# Patient Record
Sex: FEMALE | Race: WHITE
[De-identification: names, ages, dates, MRNs, and addresses within clinical notes are randomized per-mention and may not be internally consistent; named-entity substitution may affect disease eponyms.]

---

## 2020-02-10 ENCOUNTER — HOSPITAL ENCOUNTER (OUTPATIENT)
Dept: HOSPITAL 47 - BARWHC3 | Age: 55
Discharge: HOME | End: 2020-02-10
Attending: SURGERY
Payer: COMMERCIAL

## 2020-02-10 VITALS — HEART RATE: 101 BPM | DIASTOLIC BLOOD PRESSURE: 87 MMHG | TEMPERATURE: 97.7 F | SYSTOLIC BLOOD PRESSURE: 114 MMHG

## 2020-02-10 VITALS — BODY MASS INDEX: 35.4 KG/M2

## 2020-02-10 DIAGNOSIS — Z98.84: ICD-10-CM

## 2020-02-10 DIAGNOSIS — F17.200: ICD-10-CM

## 2020-02-10 DIAGNOSIS — E88.81: ICD-10-CM

## 2020-02-10 DIAGNOSIS — E55.9: ICD-10-CM

## 2020-02-10 DIAGNOSIS — E66.01: Primary | ICD-10-CM

## 2020-02-10 LAB
ERYTHROCYTE [DISTWIDTH] IN BLOOD BY AUTOMATED COUNT: 5.02 M/UL (ref 3.8–5.4)
ERYTHROCYTE [DISTWIDTH] IN BLOOD: 13.1 % (ref 11.5–15.5)
HCT VFR BLD AUTO: 46 % (ref 34–46)
HGB BLD-MCNC: 15 GM/DL (ref 11.4–16)
MCH RBC QN AUTO: 29.8 PG (ref 25–35)
MCHC RBC AUTO-ENTMCNC: 32.6 G/DL (ref 31–37)
MCV RBC AUTO: 91.5 FL (ref 80–100)
PLATELET # BLD AUTO: 261 K/UL (ref 150–450)
WBC # BLD AUTO: 7.7 K/UL (ref 3.8–10.6)

## 2020-02-10 PROCEDURE — 84425 ASSAY OF VITAMIN B-1: CPT

## 2020-02-10 PROCEDURE — 83036 HEMOGLOBIN GLYCOSYLATED A1C: CPT

## 2020-02-10 PROCEDURE — 80053 COMPREHEN METABOLIC PANEL: CPT

## 2020-02-10 PROCEDURE — 82607 VITAMIN B-12: CPT

## 2020-02-10 PROCEDURE — 93005 ELECTROCARDIOGRAM TRACING: CPT

## 2020-02-10 PROCEDURE — 99211 OFF/OP EST MAY X REQ PHY/QHP: CPT

## 2020-02-10 PROCEDURE — 82746 ASSAY OF FOLIC ACID SERUM: CPT

## 2020-02-10 PROCEDURE — 36415 COLL VENOUS BLD VENIPUNCTURE: CPT

## 2020-02-10 PROCEDURE — 85027 COMPLETE CBC AUTOMATED: CPT

## 2020-02-10 PROCEDURE — 82306 VITAMIN D 25 HYDROXY: CPT

## 2020-02-11 LAB
ALBUMIN SERPL-MCNC: 4.6 G/DL (ref 3.8–4.9)
ALBUMIN/GLOB SERPL: 2 G/DL (ref 1.6–3.17)
ALP SERPL-CCNC: 108 U/L (ref 41–126)
ALT SERPL-CCNC: 21 U/L (ref 8–44)
ANION GAP SERPL CALC-SCNC: 10.5 MMOL/L (ref 4–12)
AST SERPL-CCNC: 19 U/L (ref 13–35)
BUN SERPL-SCNC: 15 MG/DL (ref 9–27)
BUN/CREAT SERPL: 18.75 RATIO (ref 12–20)
CALCIUM SPEC-MCNC: 9.6 MG/DL (ref 8.7–10.3)
CHLORIDE SERPL-SCNC: 107 MMOL/L (ref 96–109)
CO2 SERPL-SCNC: 22.5 MMOL/L (ref 21.6–31.8)
GLOBULIN SER CALC-MCNC: 2.3 G/DL (ref 1.6–3.3)
GLUCOSE SERPL-MCNC: 105 MG/DL (ref 70–110)
HBA1C MFR BLD: 5.2 % (ref 4–6)
POTASSIUM SERPL-SCNC: 4.1 MMOL/L (ref 3.5–5.5)
PROT SERPL-MCNC: 6.9 G/DL (ref 6.2–8.2)
SODIUM SERPL-SCNC: 140 MMOL/L (ref 135–145)
VIT B12 SERPL-MCNC: 397 PG/ML (ref 200–944)

## 2020-02-17 NOTE — P.HPBAR
Bariatric H&P





- History & Physicial


H&P Date: 02/10/20


History & Physicial: 


Visit/CC: sleeve consult





Patient initial contact: 





Initial weight: 102.648 kg


Initial weight in pounds: 226.30


Height: 5 ft 7 in


Initial BMI: 35.4





Last weight: 





Current weight: 102.648 kg


Current weight in pounds: 226.30


Current BMI: 35.4





Ideal body weight (based on NIH guidelines): 61.235 kg





Excess body weight loss: 0.0%








The patient is a 54 year-old F who presents for Bariatric Assessment.  Patient 

presents today for presurgical gastric sleeve consultation.  She has had 

lifetime problems obesity.  Her LAP-BAND was removed over 10 years ago.  Her 

current BMI is 35.














Past Medical History


Past Medical History: Hyperlipidemia, Hypertension


Additional Past Medical History / Comment(s): Chrohns Disease,


History of Any Multi-Drug Resistant Organisms: C-DIFF


Year Discovered:: November 2018


MDRO Source:: stool


Past Surgical History: Bariatric Surgery, Bladder Surgery, Breast Surgery, 

Orthopedic Surgery


Additional Past Surgical History / Comment(s): lap band placed 9/11/2001 

(removed 2010), right hand trigger finger release, bladder suspension, bilateral

breast augmentation, panniculectomy,


Past Anesthesia/Blood Transfusion Reactions: No Reported Reaction


Past Psychological History: Anxiety, Depression


Additional Psychological History / Comment(s): Takes Zoloft and wellbutrin daily


Smoking Status: Current some day smoker


Past Alcohol Use History: Occasional


Past Drug Use History: None Reported





Surgical - Exam


                                   Vital Signs











Temp Pulse BP


 


 97.7 F   101 H  114/87 


 


 02/10/20 14:10  02/10/20 14:10  02/10/20 14:10














- General


well developed, no distress





- Eyes


PERRL





- ENT


normal pinna





- Neck


no masses





- Respiratory


normal expansion





- Cardiovascular


Rhythm: regular





- Abdomen


Abdomen: soft, non tender





Results





- Labs





                                 02/10/20 15:31





                                 02/10/20 15:31





Bariatric Assessment & Plan


Plan: 





Morbid obesity, BMI 35.  Dilantin discussion with the patient regarding sleeve 

gastrectomy.  I went over the risks and benefits of procedure including possible

gastric staple line disruption, bleeding or scarring.





Bariatric Checklist


Checklist: 


Plan: 





Checklist: 





EGD: 


1. Hiatal hernia: 


2. H. Pylori: 





HgbA1c: 





Vitamin D: 





Smoking: Current some day smoker





Primary care physician referral: Dr. AMINA Alcantar ([facility]) 

PH:[phone]





Psychiatry clearance: 





Cardiology clearance: 





Sleep study: 





Diet journal: 





VTE risk score: 





VTE risk level: 





Rehab needs at discharge:

## 2020-02-24 ENCOUNTER — HOSPITAL ENCOUNTER (OUTPATIENT)
Dept: HOSPITAL 47 - ORWHC2ENDO | Age: 55
Discharge: HOME | End: 2020-02-24
Attending: SURGERY
Payer: COMMERCIAL

## 2020-02-24 VITALS — RESPIRATION RATE: 17 BRPM | SYSTOLIC BLOOD PRESSURE: 111 MMHG | HEART RATE: 65 BPM | DIASTOLIC BLOOD PRESSURE: 68 MMHG

## 2020-02-24 VITALS — TEMPERATURE: 98.6 F

## 2020-02-24 VITALS — BODY MASS INDEX: 36.8 KG/M2

## 2020-02-24 DIAGNOSIS — I10: ICD-10-CM

## 2020-02-24 DIAGNOSIS — E78.5: ICD-10-CM

## 2020-02-24 DIAGNOSIS — K50.90: ICD-10-CM

## 2020-02-24 DIAGNOSIS — F32.9: ICD-10-CM

## 2020-02-24 DIAGNOSIS — K29.50: ICD-10-CM

## 2020-02-24 DIAGNOSIS — Z86.19: ICD-10-CM

## 2020-02-24 DIAGNOSIS — F17.200: ICD-10-CM

## 2020-02-24 DIAGNOSIS — Z79.899: ICD-10-CM

## 2020-02-24 DIAGNOSIS — E66.01: ICD-10-CM

## 2020-02-24 DIAGNOSIS — K21.0: Primary | ICD-10-CM

## 2020-02-24 DIAGNOSIS — K22.8: ICD-10-CM

## 2020-02-24 DIAGNOSIS — K44.9: ICD-10-CM

## 2020-02-24 DIAGNOSIS — Z98.84: ICD-10-CM

## 2020-02-24 PROCEDURE — 43239 EGD BIOPSY SINGLE/MULTIPLE: CPT

## 2020-02-24 PROCEDURE — 88312 SPECIAL STAINS GROUP 1: CPT

## 2020-02-24 PROCEDURE — 88305 TISSUE EXAM BY PATHOLOGIST: CPT

## 2020-02-24 NOTE — P.OP
Date of Procedure: 02/24/20


Preoperative Diagnosis: 


Morbid obesity, BMI 36





GERD


Postoperative Diagnosis: 


4 obesity.





Antral gastritis





Hiatal hernia





Esophagitis


Procedure(s) Performed: 


EGD


Anesthesia: MAC


Surgeon: Matthew Eason


Pathology: other (Antrum, esophagus)


Condition: stable


Disposition: PACU


Description of Procedure: 


The patient's placed on the endoscopy table in the lateral position.  She 

received IV sedation.  The gastroscope placed oropharynx and passed in the 

esophagus and into the stomach.  Scope was then placed through the pylorus.  The

first and second portion of the duodenum appeared normal.  The scope was then 

brought back the antrum this was mildly inflamed.  A biopsies performed.  The 

scope was then retroflexed and the remainder of the stomach appeared normal.  

There was a moderate size hiatal hernia.  The GE junction was at 37 is.  The 

distal esophagus appeared mildly inflamed a biopsies performed.  The proximal 

esophagus appeared normal.  Scope withdrawn for patient.

## 2020-02-24 NOTE — P.GSHP
History of Present Illness


H&P Date: 02/24/20


Chief Complaint: GERD, morbid obesity





This a 55-year-old female who presents today for EGD.  She's had issues with 

GERD.  She's going pieces BMI 36





Past Medical History


Past Medical History: GERD/Reflux, Hyperlipidemia, Hypertension


Additional Past Medical History / Comment(s): Crohns Disease


History of Any Multi-Drug Resistant Organisms: C-DIFF


Date of last positivie culture/infection: November 2018


MDRO Source:: stool


Past Surgical History: Bariatric Surgery, Bladder Surgery, Breast Surgery, 

Orthopedic Surgery


Additional Past Surgical History / Comment(s): lap band placed 9/11/2001 

(removed 2010), right hand trigger finger release, bladder suspension, bilateral

breast augmentation, panniculectomy,


Past Anesthesia/Blood Transfusion Reactions: No Reported Reaction


Smoking Status: Current some day smoker





Medications and Allergies


                                Home Medications











 Medication  Instructions  Recorded  Confirmed  Type


 


Gabapentin [Neurontin] 300 mg PO DAILY 02/10/20 02/24/20 History


 


Mesalamine [Delzicol] 400 mg PO Q6HR 02/10/20 02/24/20 History


 


Rosuvastatin [Crestor] 20 mg PO HS 02/10/20 02/24/20 History


 


Sertraline [Zoloft] 100 mg PO DAILY 02/10/20 02/24/20 History


 


Ustekinumab [Stelara] 45 mg IM QMONTH 02/10/20 02/24/20 History


 


Zolpidem [Ambien] 10 mg PO HS PRN 02/10/20 02/24/20 History


 


amLODIPine [Norvasc] 5 mg PO DAILY 02/10/20 02/24/20 History


 


buPROPion XL [Wellbutrin XL] 150 mg PO DAILY 02/10/20 02/24/20 History








                                    Allergies











Allergy/AdvReac Type Severity Reaction Status Date / Time


 


No Known Allergies Allergy   Verified 02/24/20 10:11














Surgical - Exam


                                   Vital Signs











Pulse Resp BP Pulse Ox


 


 77   16   144/65   98 


 


 02/24/20 10:14  02/24/20 10:14  02/24/20 10:14  02/24/20 10:14














- General


well developed, well nourished, no distress





- Eyes


PERRL





- ENT


normal pinna





- Neck


no masses





- Respiratory


normal expansion





- Cardiovascular


Rhythm: regular





- Abdomen


Abdomen: soft, non tender





Assessment and Plan


Assessment: 





Morbid obesity





GERD.  We'll perform EGD.